# Patient Record
Sex: MALE | Race: WHITE | NOT HISPANIC OR LATINO | Employment: PART TIME
[De-identification: names, ages, dates, MRNs, and addresses within clinical notes are randomized per-mention and may not be internally consistent; named-entity substitution may affect disease eponyms.]

---
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
## 2024-01-18 ENCOUNTER — OFFICE VISIT (OUTPATIENT)
Dept: FAMILY MEDICINE | Facility: CLINIC | Age: 19
End: 2024-01-18
Payer: COMMERCIAL

## 2024-01-18 VITALS
OXYGEN SATURATION: 99 % | HEART RATE: 107 BPM | WEIGHT: 195.5 LBS | SYSTOLIC BLOOD PRESSURE: 156 MMHG | HEIGHT: 72 IN | RESPIRATION RATE: 16 BRPM | BODY MASS INDEX: 26.48 KG/M2 | TEMPERATURE: 99 F | DIASTOLIC BLOOD PRESSURE: 72 MMHG

## 2024-01-18 DIAGNOSIS — Z11.4 SCREENING FOR HIV (HUMAN IMMUNODEFICIENCY VIRUS): Primary | ICD-10-CM

## 2024-01-18 DIAGNOSIS — I10 BENIGN ESSENTIAL HYPERTENSION: ICD-10-CM

## 2024-01-18 DIAGNOSIS — Z11.59 NEED FOR HEPATITIS C SCREENING TEST: ICD-10-CM

## 2024-01-18 DIAGNOSIS — R01.1 HEART MURMUR: ICD-10-CM

## 2024-01-18 LAB
ANION GAP SERPL CALCULATED.3IONS-SCNC: 5 MMOL/L (ref 7–15)
BUN SERPL-MCNC: 12.7 MG/DL (ref 6–20)
CALCIUM SERPL-MCNC: 9.5 MG/DL (ref 8.6–10)
CHLORIDE SERPL-SCNC: 102 MMOL/L (ref 98–107)
CREAT SERPL-MCNC: 0.81 MG/DL (ref 0.67–1.17)
DEPRECATED HCO3 PLAS-SCNC: 32 MMOL/L (ref 22–29)
EGFRCR SERPLBLD CKD-EPI 2021: >90 ML/MIN/1.73M2
GLUCOSE SERPL-MCNC: 101 MG/DL (ref 70–99)
POTASSIUM SERPL-SCNC: 4.4 MMOL/L (ref 3.4–5.3)
SODIUM SERPL-SCNC: 139 MMOL/L (ref 135–145)

## 2024-01-18 PROCEDURE — 36415 COLL VENOUS BLD VENIPUNCTURE: CPT

## 2024-01-18 PROCEDURE — 80048 BASIC METABOLIC PNL TOTAL CA: CPT

## 2024-01-18 PROCEDURE — 93000 ELECTROCARDIOGRAM COMPLETE: CPT

## 2024-01-18 PROCEDURE — 99204 OFFICE O/P NEW MOD 45 MIN: CPT | Mod: 25

## 2024-01-18 ASSESSMENT — PAIN SCALES - GENERAL: PAINLEVEL: NO PAIN (0)

## 2024-01-18 NOTE — PROGRESS NOTES
Assessment & Plan     Heart murmur  - Echocardiogram Complete; Future    Benign essential hypertension  Chronic uncontrolled  - Basic metabolic panel  (Ca, Cl, CO2, Creat, Gluc, K, Na, BUN); Future  - Albumin Random Urine Quantitative with Creat Ratio; Future  - EKG 12-lead complete w/read - Clinics  - Basic metabolic panel  (Ca, Cl, CO2, Creat, Gluc, K, Na, BUN)      EKG shows normal sinus rhythm with isolated ST elevation in V3 and isolated T wave inversion in lead III.  No other concerns in the EKG  Given that patient has elevated blood pressure for his age and fairly good health habits without significant family history, we could consider whitecoat hypertension as a differential.  I would like him to monitor his blood pressure at home and report the numbers back to me.  We will start medication within several days if patient has elevated numbers at home.  Given his presence of a murmur and radiation to the axillary area, I would be interested in doing an echocardiogram to rule out stenosis  If the workup was negative and his numbers are persistently elevated at home, I would also consider renal artery ultrasound      Titus Aguilar is a 19 year old, presenting for the following health issues:  Found out BP was elevated at dentist. Does not measure BP at home.  2 years ago, patient had a little bit elevated blood pressure. No lightheadness outside of positional.    No chest pain, shortness of breath. No fatigue.     Exercise: Gym 4x/week. 1-1.5 hours at gym- weight training. 40 minutes of cardio. Walk to classes on campus.  Diet: Tries to cook dinner for himself. Tries to stay away from frozen meals. B-fast: eggs, oatmeal, pancakes.  Caffeine: none (used to do energy drink)  Nicotine: none  Alcohol: 1x/month- 4-5 drinks  Drug: Weed 1x/month.    Hypertension      1/18/2024     3:30 PM   Additional Questions   Roomed by Dena Burnham     History of Present Illness       Reason for visit:  Preventative care +  "checkup    He eats 2-3 servings of fruits and vegetables daily.He consumes 0 sweetened beverage(s) daily.He exercises with enough effort to increase his heart rate 60 or more minutes per day.  He exercises with enough effort to increase his heart rate 4 days per week.   He is taking medications regularly.         Review of Systems  Constitutional, HEENT, cardiovascular, pulmonary, gi and gu systems are negative, except as otherwise noted.      Objective    BP (!) 167/92   Pulse 107   Temp 99  F (37.2  C) (Temporal)   Resp 16   Ht 1.841 m (6' 0.48\")   Wt 88.7 kg (195 lb 8 oz)   SpO2 99%   BMI 26.16 kg/m    Body mass index is 26.16 kg/m .  Physical Exam   GENERAL: alert and no distress  RESP: lungs clear to auscultation - no rales, rhonchi or wheezes  CV: regular rate and rhythm, normal S1 S2, no S3 or S4, click or rub, no peripheral edema.  Systolic murmur seems louder  month supine, with radiation to the left axillary area.  Murmur 2/6.  SKIN: no suspicious lesions or rashes          Signed Electronically by: Chintan Ramírez NP    "

## 2024-01-27 ENCOUNTER — LAB (OUTPATIENT)
Dept: LAB | Facility: CLINIC | Age: 19
End: 2024-01-27
Payer: COMMERCIAL

## 2024-01-27 DIAGNOSIS — I10 BENIGN ESSENTIAL HYPERTENSION: ICD-10-CM

## 2024-01-27 LAB
CREAT UR-MCNC: 182 MG/DL
MICROALBUMIN UR-MCNC: 13.2 MG/L
MICROALBUMIN/CREAT UR: 7.25 MG/G CR (ref 0–17)

## 2024-01-27 PROCEDURE — 82570 ASSAY OF URINE CREATININE: CPT

## 2024-01-27 PROCEDURE — 99000 SPECIMEN HANDLING OFFICE-LAB: CPT | Performed by: PATHOLOGY

## 2024-01-31 ENCOUNTER — HOSPITAL ENCOUNTER (OUTPATIENT)
Dept: CARDIOLOGY | Facility: CLINIC | Age: 19
Discharge: HOME | End: 2024-01-31
Payer: COMMERCIAL

## 2024-01-31 DIAGNOSIS — R01.1 HEART MURMUR: ICD-10-CM

## 2024-01-31 LAB — LVEF ECHO: NORMAL

## 2024-01-31 PROCEDURE — 93306 TTE W/DOPPLER COMPLETE: CPT

## 2024-08-17 ENCOUNTER — OFFICE VISIT (OUTPATIENT)
Dept: URGENT CARE | Facility: URGENT CARE | Age: 19
End: 2024-08-17
Payer: COMMERCIAL

## 2024-08-17 ENCOUNTER — ANCILLARY PROCEDURE (OUTPATIENT)
Dept: GENERAL RADIOLOGY | Facility: CLINIC | Age: 19
End: 2024-08-17
Attending: PHYSICIAN ASSISTANT
Payer: COMMERCIAL

## 2024-08-17 ENCOUNTER — NURSE TRIAGE (OUTPATIENT)
Dept: NURSING | Facility: CLINIC | Age: 19
End: 2024-08-17
Payer: COMMERCIAL

## 2024-08-17 VITALS
HEART RATE: 98 BPM | SYSTOLIC BLOOD PRESSURE: 153 MMHG | RESPIRATION RATE: 19 BRPM | BODY MASS INDEX: 26.41 KG/M2 | WEIGHT: 195 LBS | TEMPERATURE: 97.1 F | OXYGEN SATURATION: 99 % | HEIGHT: 72 IN | DIASTOLIC BLOOD PRESSURE: 75 MMHG

## 2024-08-17 DIAGNOSIS — R06.02 SHORTNESS OF BREATH: ICD-10-CM

## 2024-08-17 DIAGNOSIS — K21.9 GASTROESOPHAGEAL REFLUX DISEASE, UNSPECIFIED WHETHER ESOPHAGITIS PRESENT: ICD-10-CM

## 2024-08-17 DIAGNOSIS — R07.9 CHEST PAIN, UNSPECIFIED TYPE: Primary | ICD-10-CM

## 2024-08-17 PROCEDURE — 71046 X-RAY EXAM CHEST 2 VIEWS: CPT | Mod: TC | Performed by: RADIOLOGY

## 2024-08-17 PROCEDURE — 93000 ELECTROCARDIOGRAM COMPLETE: CPT | Performed by: PHYSICIAN ASSISTANT

## 2024-08-17 PROCEDURE — 99204 OFFICE O/P NEW MOD 45 MIN: CPT | Performed by: PHYSICIAN ASSISTANT

## 2024-08-17 ASSESSMENT — ENCOUNTER SYMPTOMS
CHEST TIGHTNESS: 1
LIGHT-HEADEDNESS: 0
DIZZINESS: 0
DIAPHORESIS: 0
SHORTNESS OF BREATH: 1
COUGH: 0

## 2024-08-17 ASSESSMENT — PAIN SCALES - GENERAL: PAINLEVEL: NO PAIN (0)

## 2024-08-17 NOTE — PATIENT INSTRUCTIONS
Chest pain most likely due to acid reflux.  Follow-up in the emergency room if symptoms worsen any hour.  EKG was normal.  Chest x-ray was normal.  Try omeprazole for acid reflux for a month and follow-up with primary care provider  Take the medication daily on an empty stomach with a glass of water, 30 to 60 minutes before eating.  The medication takes 1 to 4 days to start working.

## 2024-08-17 NOTE — PROGRESS NOTES
Assessment & Plan        1. Chest pain, unspecified type    -EKG was normal, sinus rhythm  - XR Chest 2 Views is negative for cardiopulmonary disease per Radiology report  -Chest pain most likely due to acid reflux.  Patient will try omeprazole for a month and follow-up with primary care provider.  I advised patient and mother mother to follow-up in the emergency room if symptoms worsen at any hour.    2. Shortness of breath    -EKG was done in the clinic and shows normal sinus rhythm.  No concerns for acute coronary syndrome at this time  - EKG 12-lead complete w/read - Clinics     3. Gastroesophageal reflux disease, unspecified whether esophagitis present    - omeprazole (PRILOSEC) 20 MG DR capsule; Take 1 capsule (20 mg) by mouth daily for 30 days Take the medication with a glass of water.  Take the medication on an empty stomach, 30 to 60 minutes before breakfast  Dispense: 30 capsule; Refill: 0    Results for orders placed or performed in visit on 08/17/24   XR Chest 2 Views     Status: None    Narrative    EXAM: XR CHEST 2 VIEWS  LOCATION: [facility]  DATE: 8/17/2024    INDICATION: chest tightness for 2 weeks  COMPARISON: None.    FINDINGS: The lungs are clear. Normal heart size and pulmonary vascularity. No pleural effusions. No significant osseous pathology.      Impression    IMPRESSION: Negative chest.       Patient Instructions   Chest pain most likely due to acid reflux.  Follow-up in the emergency room if symptoms worsen any hour.  EKG was normal.  Chest x-ray was normal.  Try omeprazole for acid reflux for a month and follow-up with primary care provider  Take the medication daily on an empty stomach with a glass of water, 30 to 60 minutes before eating.  The medication takes 1 to 4 days to start working.    Return if symptoms worsen or fail to improve, for Follow up in the Emergency room.    At the end of the encounter, I discussed results, diagnosis, medications.  Discussed red flags for immediate return to clinic/ER, as well as indications for follow up if no improvement. Patient understood and agreed to plan. Patient was stable for discharge.    Titus Aguilar is a 19 year old male who presents to clinic today with mother for the following health issues:  Chief Complaint   Patient presents with    Urgent Care     Chest pain last 2 weeks ( lasting 30 seconds)  Tightness in chest   Sob      HPI  Patient reports chest pain for 2 weeks.  He notes intermittent chest pain which lasts 30 seconds each time.  He describes the pain as random sharp pain.  He has woken up in the middle of night with chest pain which resolves.  He feels pain in the center of the chest around the sternum.  He also reports chest tightness and shortness of breath.  He currently is not experiencing chest pain in the clinic.  He had an echo done 6 months ago which came back normal.  Blood pressure has been elevated several times in the clinic.  He is following up with PCP concerning elevated blood pressure.  He denies cardiac history, GERD. he denies history of URI symptoms, cough.      Review of Systems   Constitutional:  Negative for diaphoresis.   Respiratory:  Positive for chest tightness and shortness of breath. Negative for cough.    Neurological:  Negative for dizziness and light-headedness.       Problem List:  Allergic Rhinitis  Vaccination Not Carried Out Due To Caregiver Refusal  Functional Murmur      No past medical history on file.    Social History     Tobacco Use    Smoking status: Never    Smokeless tobacco: Never   Substance Use Topics    Alcohol use: Not on file           Objective    BP (!) 168/68 (BP Location: Right arm, Patient Position: Sitting, Cuff Size: Adult Regular)   Pulse 98   Temp 97.1  F (36.2  C) (Temporal)   Resp 19   Ht 1.829 m (6')   Wt 88.5 kg (195 lb)   SpO2 99%   BMI 26.45 kg/m    Physical Exam  Constitutional:       Appearance: Normal appearance.   HENT:       Head: Normocephalic.      Mouth/Throat:      Mouth: Mucous membranes are moist.      Pharynx: Oropharynx is clear. Uvula midline. No posterior oropharyngeal erythema.   Cardiovascular:      Rate and Rhythm: Normal rate and regular rhythm.   Pulmonary:      Effort: Pulmonary effort is normal.      Breath sounds: Normal breath sounds.   Lymphadenopathy:      Head:      Right side of head: No submental, submandibular or tonsillar adenopathy.      Left side of head: No submental, submandibular or tonsillar adenopathy.      Cervical: No cervical adenopathy.      Right cervical: No superficial cervical adenopathy.     Left cervical: No superficial cervical adenopathy.   Skin:     General: Skin is warm and dry.      Findings: No rash.   Neurological:      Mental Status: He is alert.   Psychiatric:         Mood and Affect: Mood normal.         Behavior: Behavior normal.              Olga Starkey PA-C

## 2024-08-17 NOTE — TELEPHONE ENCOUNTER
Nurse Triage SBAR    Is this a 2nd Level Triage? NO    Situation: Triage call for chest pain and SOB. Pt thinks he needs an appointment.     Background:  Symptoms started 1-2 weeks ago.     Assessment: Once a day, for 30 second periods, random sharp pain in chest. Started 1-2 weeks ago.     SOB daily. He feels SOB with exertion. He also feels SOB at rest sometimes. At rest, he feels SOB 1-2 times per day. SOB with all exertion efforts.     When chest pain comes, he doesn't feel SOB.    He also notes feeling heartburn (acid in throat) 1-2 times.     No acute respiratory symptoms.     Protocol Recommended Disposition:   See a provider within 4 hours, UC advised. Patient verbalized understanding and had no further questions.      Shanice Messina RN  [facility] Nurse Advisor  8/17/2024 10:35 AM           Reason for Disposition   [1] MILD difficulty breathing (e.g., minimal/no SOB at rest, SOB with walking, pulse <100) AND [2] NEW-onset or WORSE than normal    Additional Information   Negative: SEVERE difficulty breathing (e.g., struggling for each breath, speaks in single words)   Negative: [1] Breathing stopped AND [2] hasn't returned   Negative: Choking on something   Negative: Bluish (or gray) lips or face now   Negative: Difficult to awaken or acting confused (e.g., disoriented, slurred speech)   Negative: Passed out (i.e., lost consciousness, collapsed and was not responding)   Negative: Wheezing started suddenly after medicine, an allergic food or bee sting   Negative: Stridor (harsh sound while breathing in)   Negative: Slow, shallow and weak breathing   Negative: Sounds like a life-threatening emergency to the triager   Negative: [1] MODERATE difficulty breathing (e.g., speaks in phrases, SOB even at rest, pulse 100-120) AND [2] NEW-onset or WORSE than normal   Negative: Oxygen level (e.g., pulse oximetry) 90 percent or lower   Negative: Wheezing can be heard across the room   Negative: Drooling or spitting out  "saliva (because can't swallow)   Negative: History of prior \"blood clot\" in leg or lungs (i.e., deep vein thrombosis, pulmonary embolism)   Negative: History of inherited increased risk of blood clots (e.g., Factor 5 Leiden, Anti-thrombin 3, Protein C or Protein S deficiency, Prothrombin mutation)   Negative: Major surgery in the past month   Negative: Hip or leg fracture (broken bone) in past month (or had cast on leg or ankle in past month)   Negative: Illness requiring prolonged bedrest in past month (e.g., immobilization, long hospital stay)   Negative: Long-distance travel in past month (e.g., car, bus, train, plane; with trip lasting 6 or more hours)   Negative: Cancer treatment in past six months (or has cancer now)   Negative: Extra heartbeats, irregular heart beating, or heart is beating very fast  (i.e., \"palpitations\")   Negative: Fever > 103 F (39.4 C)   Negative: [1] Fever > 101 F (38.3 C) AND [2] age > 60 years   Negative: [1] Fever > 100.0 F (37.8 C) AND [2] bedridden (e.g., CVA, chronic illness, recovering from surgery)   Negative: [1] Fever > 100.0 F (37.8 C) AND [2] diabetes mellitus or weak immune system (e.g., HIV positive, cancer chemo, splenectomy, organ transplant, chronic steroids)   Negative: [1] Periods where breathing stops and then resumes normally AND [2] bedridden (e.g., CVA)   Negative: Pregnant or postpartum (from 0 to 6 weeks after delivery)   Negative: Patient sounds very sick or weak to the triager   Negative: Chest pain     No chest pain during triage call so this was bypassed.   Negative: [1] Wheezing (high pitched whistling sound) AND [2] previous asthma attacks or use of asthma medicines   Negative: [1] Difficulty breathing AND [2] only present when coughing   Negative: [1] Difficulty breathing AND [2] only from stuffy or runny nose   Negative: [1] Difficulty breathing AND [2] within 14 days of COVID-19 Exposure    Protocols used: Breathing Difficulty-A-AH    "

## 2024-12-30 ENCOUNTER — PATIENT OUTREACH (OUTPATIENT)
Dept: CARE COORDINATION | Facility: CLINIC | Age: 19
End: 2024-12-30
Payer: COMMERCIAL

## 2025-01-06 ENCOUNTER — OFFICE VISIT (OUTPATIENT)
Dept: FAMILY MEDICINE | Facility: CLINIC | Age: 20
End: 2025-01-06
Payer: COMMERCIAL

## 2025-01-06 VITALS
HEART RATE: 104 BPM | TEMPERATURE: 99.4 F | DIASTOLIC BLOOD PRESSURE: 72 MMHG | RESPIRATION RATE: 10 BRPM | SYSTOLIC BLOOD PRESSURE: 136 MMHG | WEIGHT: 203 LBS | HEIGHT: 73 IN | OXYGEN SATURATION: 99 % | BODY MASS INDEX: 26.9 KG/M2

## 2025-01-06 DIAGNOSIS — Z11.4 SCREENING FOR HIV (HUMAN IMMUNODEFICIENCY VIRUS): Primary | ICD-10-CM

## 2025-01-06 DIAGNOSIS — Z11.59 NEED FOR HEPATITIS C SCREENING TEST: ICD-10-CM

## 2025-01-06 DIAGNOSIS — I10 BENIGN ESSENTIAL HYPERTENSION: ICD-10-CM

## 2025-01-06 DIAGNOSIS — R10.12 ABDOMINAL PAIN, LEFT UPPER QUADRANT: ICD-10-CM

## 2025-01-06 LAB
ANION GAP SERPL CALCULATED.3IONS-SCNC: 11 MMOL/L (ref 7–15)
BASOPHILS # BLD AUTO: 0 10E3/UL (ref 0–0.2)
BASOPHILS NFR BLD AUTO: 0 %
BUN SERPL-MCNC: 14.6 MG/DL (ref 6–20)
CALCIUM SERPL-MCNC: 10 MG/DL (ref 8.8–10.4)
CHLORIDE SERPL-SCNC: 102 MMOL/L (ref 98–107)
CREAT SERPL-MCNC: 0.93 MG/DL (ref 0.67–1.17)
CREAT UR-MCNC: 115 MG/DL
EGFRCR SERPLBLD CKD-EPI 2021: >90 ML/MIN/1.73M2
EOSINOPHIL # BLD AUTO: 0.1 10E3/UL (ref 0–0.7)
EOSINOPHIL NFR BLD AUTO: 1 %
ERYTHROCYTE [DISTWIDTH] IN BLOOD BY AUTOMATED COUNT: 12.7 % (ref 10–15)
GLUCOSE SERPL-MCNC: 85 MG/DL (ref 70–99)
HCO3 SERPL-SCNC: 26 MMOL/L (ref 22–29)
HCT VFR BLD AUTO: 46.6 % (ref 40–53)
HCV AB SERPL QL IA: NONREACTIVE
HGB BLD-MCNC: 16 G/DL (ref 13.3–17.7)
HIV 1+2 AB+HIV1 P24 AG SERPL QL IA: NONREACTIVE
IMM GRANULOCYTES # BLD: 0 10E3/UL
IMM GRANULOCYTES NFR BLD: 0 %
LYMPHOCYTES # BLD AUTO: 1.6 10E3/UL (ref 0.8–5.3)
LYMPHOCYTES NFR BLD AUTO: 19 %
MCH RBC QN AUTO: 29.5 PG (ref 26.5–33)
MCHC RBC AUTO-ENTMCNC: 34.3 G/DL (ref 31.5–36.5)
MCV RBC AUTO: 86 FL (ref 78–100)
MICROALBUMIN UR-MCNC: <12 MG/L
MICROALBUMIN/CREAT UR: NORMAL MG/G{CREAT}
MONOCYTES # BLD AUTO: 0.7 10E3/UL (ref 0–1.3)
MONOCYTES NFR BLD AUTO: 8 %
NEUTROPHILS # BLD AUTO: 6 10E3/UL (ref 1.6–8.3)
NEUTROPHILS NFR BLD AUTO: 72 %
PLATELET # BLD AUTO: 247 10E3/UL (ref 150–450)
POTASSIUM SERPL-SCNC: 4.2 MMOL/L (ref 3.4–5.3)
RBC # BLD AUTO: 5.42 10E6/UL (ref 4.4–5.9)
SODIUM SERPL-SCNC: 139 MMOL/L (ref 135–145)
WBC # BLD AUTO: 8.5 10E3/UL (ref 4–11)

## 2025-01-06 PROCEDURE — 36415 COLL VENOUS BLD VENIPUNCTURE: CPT

## 2025-01-06 PROCEDURE — 82043 UR ALBUMIN QUANTITATIVE: CPT

## 2025-01-06 PROCEDURE — 90651 9VHPV VACCINE 2/3 DOSE IM: CPT

## 2025-01-06 PROCEDURE — 99213 OFFICE O/P EST LOW 20 MIN: CPT | Mod: 25

## 2025-01-06 PROCEDURE — 90471 IMMUNIZATION ADMIN: CPT

## 2025-01-06 PROCEDURE — 86803 HEPATITIS C AB TEST: CPT

## 2025-01-06 PROCEDURE — 80048 BASIC METABOLIC PNL TOTAL CA: CPT

## 2025-01-06 PROCEDURE — 85025 COMPLETE CBC W/AUTO DIFF WBC: CPT

## 2025-01-06 PROCEDURE — 82570 ASSAY OF URINE CREATININE: CPT

## 2025-01-06 PROCEDURE — 87389 HIV-1 AG W/HIV-1&-2 AB AG IA: CPT

## 2025-01-06 ASSESSMENT — PAIN SCALES - GENERAL: PAINLEVEL_OUTOF10: NO PAIN (0)

## 2025-01-06 NOTE — PROGRESS NOTES
Assessment & Plan     Screening for HIV (human immunodeficiency virus)  - HIV Antigen Antibody Combo; Future  - HIV Antigen Antibody Combo    Need for hepatitis C screening test  - Hepatitis C Screen Reflex to HCV RNA Quant and Genotype; Future  - Hepatitis C Screen Reflex to HCV RNA Quant and Genotype    Benign essential hypertension  - 24 Hour Blood Pressure Monitor - Adult; Future  - Basic metabolic panel  (Ca, Cl, CO2, Creat, Gluc, K, Na, BUN); Future  - Albumin Random Urine Quantitative with Creat Ratio; Future  - Basic metabolic panel  (Ca, Cl, CO2, Creat, Gluc, K, Na, BUN)  - Albumin Random Urine Quantitative with Creat Ratio    Hypertension is likely genetic and primary hypertension.  No renal bruits or pulse mismatch that would correspond with the coarctation of the aorta and previous echocardiogram was negative.  He might have some whitecoat hypertension mixed in, as his home pressures tend to run 130s to 140s.  Pressures tend to go down after he takes it once.  We could order a 24-hour blood pressure monitor to confirm if he has elevated pressure at home, which will guide treatment.  I might recommend trying a low-dose of medication if he is in the 130s, as he is already fairly healthy and his habits including regular exercise, absence nicotine use.    Abdominal pain, left upper quadrant  - CBC with platelets and differential; Future  - CBC with platelets and differential    All pain is likely related to irritable bowel syndrome given the presence of left-sided pain with diarrhea that seems to be worse with stress.  He does have a family history of IBD, but he has not noted any blood in his stool send he has no systemic symptoms such as fatigue or weight loss.  CBC could potentially show if there is any anemias or elevated white count present with ductions.      Titus Aguilar is a 19 year old, presenting for the following health issues:  Establish Care    Left sided abdominal pain that started in  summer. Would occur once per week. Occurs with diarrhea. Sharp quality. Lasts for 2 minutes (1-2x/day). Prilosec worked for chest pain/acid reflux, but not abdominal. Seems to be triggered with davion. Improved with a low stress environment.  When it occurs, it tends to occur for one week, but will occur for 1-2 days. When he gets diarrhea- gets 3-4 stools. Typically [place] 6 on bad days (was [place] 7 for a week). Brown in color. No issues with constipation.  No fatigue or unintentional weight loss.    Blood pressure- he does have a family history of HTN- took his bp at home- throughout the day, it will run 140/80 at bottom number. Reports it was decently consistent over a few weeks.  Bottom numbers 60s-70s; Upper    Exercise: Gym 4x/week. 1-1.5 hours at gym- weight training. 40 minutes of cardio. Walk to classes on campus.  Diet: Tries to cook dinner for himself. Tries to stay away from frozen meals. B-fast: eggs, oatmeal, pancakes. Has been trying to keep lower.  Caffeine: none (used to do energy drink)  Nicotine: none  Alcohol: 1x/month- 4-5 drinks  Drug: Weed 1x/month.         1/6/2025    11:43 AM   Additional Questions   Roomed by Krystle LAN         1/6/2025    11:43 AM   Patient Reported Additional Medications   Patient reports taking the following new medications vitamin D     History of Present Illness       Reason for visit:  Recent stomach issues have been appearing, and previous blood pressure concerns  Symptom onset:  More than a month  Symptoms include:  Infreauent diarrhea and stomach pains. Occasional GERD symptoms. Seems to have been improving over time  Symptom intensity:  Mild  Symptom progression:  Improving  Had these symptoms before:  Yes  Has tried/received treatment for these symptoms:  Yes  Previous treatment was successful:  Yes  Prior treatment description:  Prilosec  What makes it worse:  Used to think lactose but it doesn t seem to be the case anymore. The only food I know to consistently  "upset my stomach is davion   He is taking medications regularly.        Objective    BP (!) 163/90 (BP Location: Right arm, Patient Position: Sitting, Cuff Size: Adult Regular)   Pulse 104   Temp 99.4  F (37.4  C) (Temporal)   Resp 10   Ht 1.844 m (6' 0.6\")   Wt 92.1 kg (203 lb)   SpO2 99%   BMI 27.08 kg/m    Body mass index is 27.08 kg/m .  Physical Exam   GENERAL: alert and no distress  RESP: lungs clear to auscultation - no rales, rhonchi or wheezes  CV: regular rate and rhythm, normal S1 S2, no S3 or S4, no murmur, click or rub, no peripheral edema.  No brachial femoral pulse mismatch bilaterally.  No renal bruits noted.  PSYCH: mentation appears normal, affect normal/bright          Signed Electronically by: Chintan Ramírez NP    "

## 2025-01-06 NOTE — PATIENT INSTRUCTIONS
Citrucel powder- 2 grams/day  IBS- shoot for low fodmap foods.      3-dose series at 0, 1-2 months, 6 months out

## 2025-01-18 ENCOUNTER — HEALTH MAINTENANCE LETTER (OUTPATIENT)
Age: 20
End: 2025-01-18

## 2025-01-24 ENCOUNTER — HOSPITAL ENCOUNTER (OUTPATIENT)
Dept: CARDIOLOGY | Facility: CLINIC | Age: 20
Discharge: HOME | End: 2025-01-24
Payer: COMMERCIAL

## 2025-01-24 DIAGNOSIS — I10 BENIGN ESSENTIAL HYPERTENSION: ICD-10-CM

## 2025-01-24 PROCEDURE — 93788 AMBL BP MNTR W/SW A/R: CPT
